# Patient Record
Sex: FEMALE | Race: WHITE | NOT HISPANIC OR LATINO | URBAN - METROPOLITAN AREA
[De-identification: names, ages, dates, MRNs, and addresses within clinical notes are randomized per-mention and may not be internally consistent; named-entity substitution may affect disease eponyms.]

---

## 2017-03-08 PROBLEM — Z00.00 ENCOUNTER FOR PREVENTIVE HEALTH EXAMINATION: Status: ACTIVE | Noted: 2017-03-08

## 2017-05-19 ENCOUNTER — OUTPATIENT (OUTPATIENT)
Dept: OUTPATIENT SERVICES | Facility: HOSPITAL | Age: 67
LOS: 1 days | Discharge: HOME | End: 2017-05-19

## 2017-06-28 DIAGNOSIS — E55.9 VITAMIN D DEFICIENCY, UNSPECIFIED: ICD-10-CM

## 2017-06-28 DIAGNOSIS — E03.9 HYPOTHYROIDISM, UNSPECIFIED: ICD-10-CM

## 2017-06-28 DIAGNOSIS — E10.65 TYPE 1 DIABETES MELLITUS WITH HYPERGLYCEMIA: ICD-10-CM

## 2017-06-29 ENCOUNTER — OUTPATIENT (OUTPATIENT)
Dept: OUTPATIENT SERVICES | Facility: HOSPITAL | Age: 67
LOS: 1 days | Discharge: HOME | End: 2017-06-29

## 2017-06-29 DIAGNOSIS — Z12.31 ENCOUNTER FOR SCREENING MAMMOGRAM FOR MALIGNANT NEOPLASM OF BREAST: ICD-10-CM

## 2017-07-10 ENCOUNTER — OUTPATIENT (OUTPATIENT)
Dept: OUTPATIENT SERVICES | Facility: HOSPITAL | Age: 67
LOS: 1 days | Discharge: HOME | End: 2017-07-10

## 2017-07-10 DIAGNOSIS — R92.8 OTHER ABNORMAL AND INCONCLUSIVE FINDINGS ON DIAGNOSTIC IMAGING OF BREAST: ICD-10-CM

## 2017-07-19 PROBLEM — D24.2 INTRADUCTAL PAPILLOMA OF BREAST, LEFT: Status: ACTIVE | Noted: 2017-07-19

## 2017-07-19 PROBLEM — N60.92 ATYPICAL LOBULAR HYPERPLASIA (ALH) OF LEFT BREAST: Status: ACTIVE | Noted: 2017-07-19

## 2017-07-19 PROBLEM — R92.8 ABNORMAL MAMMOGRAM OF RIGHT BREAST: Status: ACTIVE | Noted: 2017-06-29

## 2017-07-20 ENCOUNTER — APPOINTMENT (OUTPATIENT)
Dept: BREAST CENTER | Facility: CLINIC | Age: 67
End: 2017-07-20

## 2017-07-20 VITALS
WEIGHT: 190 LBS | BODY MASS INDEX: 35.87 KG/M2 | DIASTOLIC BLOOD PRESSURE: 88 MMHG | SYSTOLIC BLOOD PRESSURE: 154 MMHG | HEIGHT: 61 IN

## 2017-07-20 DIAGNOSIS — E11.9 TYPE 2 DIABETES MELLITUS W/OUT COMPLICATIONS: ICD-10-CM

## 2017-07-20 DIAGNOSIS — R92.8 OTHER ABNORMAL AND INCONCLUSIVE FINDINGS ON DIAGNOSTIC IMAGING OF BREAST: ICD-10-CM

## 2017-07-20 DIAGNOSIS — D24.2 BENIGN NEOPLASM OF LEFT BREAST: ICD-10-CM

## 2017-07-20 DIAGNOSIS — I10 ESSENTIAL (PRIMARY) HYPERTENSION: ICD-10-CM

## 2017-07-20 DIAGNOSIS — N60.92 UNSPECIFIED BENIGN MAMMARY DYSPLASIA OF LEFT BREAST: ICD-10-CM

## 2017-07-20 DIAGNOSIS — E78.5 HYPERLIPIDEMIA, UNSPECIFIED: ICD-10-CM

## 2017-07-20 RX ORDER — INSULIN ASPART 100 [IU]/ML
INJECTION, SOLUTION INTRAVENOUS; SUBCUTANEOUS
Refills: 0 | Status: ACTIVE | COMMUNITY

## 2017-07-20 RX ORDER — SIMVASTATIN 80 MG/1
TABLET, FILM COATED ORAL
Refills: 0 | Status: ACTIVE | COMMUNITY

## 2017-07-20 RX ORDER — VALSARTAN 160 MG
CAPSULE ORAL
Refills: 0 | Status: ACTIVE | COMMUNITY

## 2017-08-29 ENCOUNTER — OUTPATIENT (OUTPATIENT)
Dept: OUTPATIENT SERVICES | Facility: HOSPITAL | Age: 67
LOS: 1 days | Discharge: HOME | End: 2017-08-29

## 2017-08-29 DIAGNOSIS — E10.9 TYPE 1 DIABETES MELLITUS WITHOUT COMPLICATIONS: ICD-10-CM

## 2017-08-29 DIAGNOSIS — E78.5 HYPERLIPIDEMIA, UNSPECIFIED: ICD-10-CM

## 2017-11-29 ENCOUNTER — OUTPATIENT (OUTPATIENT)
Dept: OUTPATIENT SERVICES | Facility: HOSPITAL | Age: 67
LOS: 1 days | Discharge: HOME | End: 2017-11-29

## 2017-11-29 DIAGNOSIS — E10.65 TYPE 1 DIABETES MELLITUS WITH HYPERGLYCEMIA: ICD-10-CM

## 2018-02-23 ENCOUNTER — OUTPATIENT (OUTPATIENT)
Dept: OUTPATIENT SERVICES | Facility: HOSPITAL | Age: 68
LOS: 1 days | Discharge: HOME | End: 2018-02-23

## 2018-02-23 DIAGNOSIS — E10.65 TYPE 1 DIABETES MELLITUS WITH HYPERGLYCEMIA: ICD-10-CM

## 2018-02-23 DIAGNOSIS — E03.9 HYPOTHYROIDISM, UNSPECIFIED: ICD-10-CM

## 2018-02-23 DIAGNOSIS — E78.5 HYPERLIPIDEMIA, UNSPECIFIED: ICD-10-CM

## 2018-03-10 ENCOUNTER — EMERGENCY (EMERGENCY)
Facility: HOSPITAL | Age: 68
LOS: 0 days | Discharge: HOME | End: 2018-03-10
Admitting: INTERNAL MEDICINE

## 2018-03-10 VITALS
RESPIRATION RATE: 20 BRPM | SYSTOLIC BLOOD PRESSURE: 167 MMHG | HEART RATE: 99 BPM | OXYGEN SATURATION: 96 % | DIASTOLIC BLOOD PRESSURE: 72 MMHG | TEMPERATURE: 97 F

## 2018-03-10 DIAGNOSIS — R05 COUGH: ICD-10-CM

## 2018-03-10 DIAGNOSIS — I10 ESSENTIAL (PRIMARY) HYPERTENSION: ICD-10-CM

## 2018-03-10 DIAGNOSIS — J40 BRONCHITIS, NOT SPECIFIED AS ACUTE OR CHRONIC: ICD-10-CM

## 2018-03-10 DIAGNOSIS — Z79.4 LONG TERM (CURRENT) USE OF INSULIN: ICD-10-CM

## 2018-03-10 DIAGNOSIS — E11.9 TYPE 2 DIABETES MELLITUS WITHOUT COMPLICATIONS: ICD-10-CM

## 2018-03-10 DIAGNOSIS — Z79.899 OTHER LONG TERM (CURRENT) DRUG THERAPY: ICD-10-CM

## 2018-03-10 RX ORDER — SIMVASTATIN 20 MG/1
1 TABLET, FILM COATED ORAL
Qty: 0 | Refills: 0 | COMMUNITY

## 2018-03-10 RX ORDER — IPRATROPIUM/ALBUTEROL SULFATE 18-103MCG
3 AEROSOL WITH ADAPTER (GRAM) INHALATION
Qty: 0 | Refills: 0 | Status: COMPLETED | OUTPATIENT
Start: 2018-03-10 | End: 2018-03-10

## 2018-03-10 RX ADMIN — Medication 3 MILLILITER(S): at 11:46

## 2018-03-10 RX ADMIN — Medication 3 MILLILITER(S): at 12:07

## 2018-03-10 RX ADMIN — Medication 3 MILLILITER(S): at 12:44

## 2018-03-10 NOTE — ED PROVIDER NOTE - PROGRESS NOTE DETAILS
Risk of worsening pneumonia d/w pt.   Pt looks well.  Abx choice considered given pt's history. Speaking in full sentences.  Vitals noted, sepsis not supported.  Does not currently appear to need admission. Abx risk dw pt. Pt aware needs f/u with PCP. Pt aware needs ER if worse.  potential s/e of meds explained : cdfiff/qtp/tendonitis/tendon rupture.  advised to d/c use if any arrythmia/chest pain/palpitations and return to ER   if any other intolerable s/e dc use and rto for further assesment. Recommneded taking probiotics

## 2018-03-10 NOTE — ED PROVIDER NOTE - OBJECTIVE STATEMENT
67 year old female with pmhx of DM, presents with cough x 10 days. Pt admits went to PMD, was prescribed z-pack. Pt complaining of worsening cough. Pt denies CP, SOB, fever, chills, nasal congestion, sore throat, recent travel, or sick contacts.

## 2018-05-18 ENCOUNTER — OUTPATIENT (OUTPATIENT)
Dept: OUTPATIENT SERVICES | Facility: HOSPITAL | Age: 68
LOS: 1 days | Discharge: HOME | End: 2018-05-18

## 2018-05-18 DIAGNOSIS — E10.9 TYPE 1 DIABETES MELLITUS WITHOUT COMPLICATIONS: ICD-10-CM

## 2018-05-18 DIAGNOSIS — E03.9 HYPOTHYROIDISM, UNSPECIFIED: ICD-10-CM

## 2018-08-14 ENCOUNTER — INPATIENT (INPATIENT)
Facility: HOSPITAL | Age: 68
LOS: 1 days | Discharge: HOME | End: 2018-08-16
Attending: INTERNAL MEDICINE | Admitting: INTERNAL MEDICINE
Payer: MEDICARE

## 2018-08-14 VITALS
OXYGEN SATURATION: 95 % | TEMPERATURE: 98 F | SYSTOLIC BLOOD PRESSURE: 157 MMHG | RESPIRATION RATE: 20 BRPM | HEART RATE: 78 BPM | DIASTOLIC BLOOD PRESSURE: 65 MMHG

## 2018-08-14 PROBLEM — I10 ESSENTIAL (PRIMARY) HYPERTENSION: Chronic | Status: ACTIVE | Noted: 2018-03-10

## 2018-08-14 PROBLEM — E11.9 TYPE 2 DIABETES MELLITUS WITHOUT COMPLICATIONS: Chronic | Status: ACTIVE | Noted: 2018-03-10

## 2018-08-14 LAB
ALBUMIN SERPL ELPH-MCNC: 4 G/DL — SIGNIFICANT CHANGE UP (ref 3.5–5.2)
ALP SERPL-CCNC: 60 U/L — SIGNIFICANT CHANGE UP (ref 30–115)
ALT FLD-CCNC: 34 U/L — SIGNIFICANT CHANGE UP (ref 0–41)
ANION GAP SERPL CALC-SCNC: 13 MMOL/L — SIGNIFICANT CHANGE UP (ref 7–14)
APTT BLD: 41.1 SEC — HIGH (ref 27–39.2)
AST SERPL-CCNC: 34 U/L — SIGNIFICANT CHANGE UP (ref 0–41)
BASOPHILS # BLD AUTO: 0.03 K/UL — SIGNIFICANT CHANGE UP (ref 0–0.2)
BASOPHILS NFR BLD AUTO: 0.4 % — SIGNIFICANT CHANGE UP (ref 0–1)
BILIRUB SERPL-MCNC: 0.3 MG/DL — SIGNIFICANT CHANGE UP (ref 0.2–1.2)
BLD GP AB SCN SERPL QL: SIGNIFICANT CHANGE UP
BUN SERPL-MCNC: 18 MG/DL — SIGNIFICANT CHANGE UP (ref 10–20)
CALCIUM SERPL-MCNC: 9.7 MG/DL — SIGNIFICANT CHANGE UP (ref 8.5–10.1)
CHLORIDE SERPL-SCNC: 101 MMOL/L — SIGNIFICANT CHANGE UP (ref 98–110)
CO2 SERPL-SCNC: 26 MMOL/L — SIGNIFICANT CHANGE UP (ref 17–32)
CREAT SERPL-MCNC: 0.7 MG/DL — SIGNIFICANT CHANGE UP (ref 0.7–1.5)
EOSINOPHIL # BLD AUTO: 0.14 K/UL — SIGNIFICANT CHANGE UP (ref 0–0.7)
EOSINOPHIL NFR BLD AUTO: 2 % — SIGNIFICANT CHANGE UP (ref 0–8)
GLUCOSE SERPL-MCNC: 105 MG/DL — HIGH (ref 70–99)
HCT VFR BLD CALC: 36.6 % — LOW (ref 37–47)
HGB BLD-MCNC: 12 G/DL — SIGNIFICANT CHANGE UP (ref 12–16)
IMM GRANULOCYTES NFR BLD AUTO: 0.3 % — SIGNIFICANT CHANGE UP (ref 0.1–0.3)
INR BLD: 1.18 RATIO — SIGNIFICANT CHANGE UP (ref 0.65–1.3)
LYMPHOCYTES # BLD AUTO: 2.61 K/UL — SIGNIFICANT CHANGE UP (ref 1.2–3.4)
LYMPHOCYTES # BLD AUTO: 37.7 % — SIGNIFICANT CHANGE UP (ref 20.5–51.1)
MAGNESIUM SERPL-MCNC: 1.9 MG/DL — SIGNIFICANT CHANGE UP (ref 1.8–2.4)
MCHC RBC-ENTMCNC: 30.5 PG — SIGNIFICANT CHANGE UP (ref 27–31)
MCHC RBC-ENTMCNC: 32.8 G/DL — SIGNIFICANT CHANGE UP (ref 32–37)
MCV RBC AUTO: 92.9 FL — SIGNIFICANT CHANGE UP (ref 81–99)
MONOCYTES # BLD AUTO: 0.55 K/UL — SIGNIFICANT CHANGE UP (ref 0.1–0.6)
MONOCYTES NFR BLD AUTO: 7.9 % — SIGNIFICANT CHANGE UP (ref 1.7–9.3)
NEUTROPHILS # BLD AUTO: 3.58 K/UL — SIGNIFICANT CHANGE UP (ref 1.4–6.5)
NEUTROPHILS NFR BLD AUTO: 51.7 % — SIGNIFICANT CHANGE UP (ref 42.2–75.2)
NRBC # BLD: 0 /100 WBCS — SIGNIFICANT CHANGE UP (ref 0–0)
PLATELET # BLD AUTO: 163 K/UL — SIGNIFICANT CHANGE UP (ref 130–400)
POTASSIUM SERPL-MCNC: 4.4 MMOL/L — SIGNIFICANT CHANGE UP (ref 3.5–5)
POTASSIUM SERPL-SCNC: 4.4 MMOL/L — SIGNIFICANT CHANGE UP (ref 3.5–5)
PROT SERPL-MCNC: 7.3 G/DL — SIGNIFICANT CHANGE UP (ref 6–8)
PROTHROM AB SERPL-ACNC: 12.7 SEC — SIGNIFICANT CHANGE UP (ref 9.95–12.87)
RBC # BLD: 3.94 M/UL — LOW (ref 4.2–5.4)
RBC # FLD: 13.2 % — SIGNIFICANT CHANGE UP (ref 11.5–14.5)
SODIUM SERPL-SCNC: 140 MMOL/L — SIGNIFICANT CHANGE UP (ref 135–146)
TYPE + AB SCN PNL BLD: SIGNIFICANT CHANGE UP
WBC # BLD: 6.93 K/UL — SIGNIFICANT CHANGE UP (ref 4.8–10.8)
WBC # FLD AUTO: 6.93 K/UL — SIGNIFICANT CHANGE UP (ref 4.8–10.8)

## 2018-08-14 RX ORDER — ASPIRIN/CALCIUM CARB/MAGNESIUM 324 MG
81 TABLET ORAL DAILY
Qty: 0 | Refills: 0 | Status: DISCONTINUED | OUTPATIENT
Start: 2018-08-14 | End: 2018-08-16

## 2018-08-14 RX ORDER — VALACYCLOVIR 500 MG/1
1000 TABLET, FILM COATED ORAL THREE TIMES A DAY
Qty: 0 | Refills: 0 | Status: DISCONTINUED | OUTPATIENT
Start: 2018-08-15 | End: 2018-08-16

## 2018-08-14 RX ORDER — SODIUM CHLORIDE 9 MG/ML
1000 INJECTION, SOLUTION INTRAVENOUS ONCE
Qty: 0 | Refills: 0 | Status: COMPLETED | OUTPATIENT
Start: 2018-08-14 | End: 2018-08-14

## 2018-08-14 RX ORDER — METOCLOPRAMIDE HCL 10 MG
10 TABLET ORAL ONCE
Qty: 0 | Refills: 0 | Status: COMPLETED | OUTPATIENT
Start: 2018-08-14 | End: 2018-08-14

## 2018-08-14 RX ORDER — SIMVASTATIN 20 MG/1
40 TABLET, FILM COATED ORAL AT BEDTIME
Qty: 0 | Refills: 0 | Status: DISCONTINUED | OUTPATIENT
Start: 2018-08-14 | End: 2018-08-16

## 2018-08-14 RX ORDER — ACYCLOVIR SODIUM 500 MG
400 VIAL (EA) INTRAVENOUS ONCE
Qty: 0 | Refills: 0 | Status: COMPLETED | OUTPATIENT
Start: 2018-08-14 | End: 2018-08-14

## 2018-08-14 RX ORDER — ACETAMINOPHEN 500 MG
650 TABLET ORAL ONCE
Qty: 0 | Refills: 0 | Status: COMPLETED | OUTPATIENT
Start: 2018-08-14 | End: 2018-08-14

## 2018-08-14 RX ORDER — LOSARTAN POTASSIUM 100 MG/1
50 TABLET, FILM COATED ORAL DAILY
Qty: 0 | Refills: 0 | Status: DISCONTINUED | OUTPATIENT
Start: 2018-08-14 | End: 2018-08-16

## 2018-08-14 RX ADMIN — Medication 81 MILLIGRAM(S): at 23:00

## 2018-08-14 RX ADMIN — SODIUM CHLORIDE 1000 MILLILITER(S): 9 INJECTION, SOLUTION INTRAVENOUS at 16:36

## 2018-08-14 RX ADMIN — Medication 60 MILLIGRAM(S): at 23:00

## 2018-08-14 RX ADMIN — SIMVASTATIN 40 MILLIGRAM(S): 20 TABLET, FILM COATED ORAL at 23:00

## 2018-08-14 RX ADMIN — Medication 10 MILLIGRAM(S): at 16:36

## 2018-08-14 RX ADMIN — Medication 650 MILLIGRAM(S): at 16:36

## 2018-08-14 RX ADMIN — Medication 400 MILLIGRAM(S): at 18:30

## 2018-08-14 NOTE — ED PROVIDER NOTE - PROGRESS NOTE DETAILS
Attending Note: 68 y/o F PMHx HTN, diabetes, and high cholesterol presents for evaluation of headache and facial numbness. Pt c/o diffuse pressure-like headache for the last few days and today pt noticed droop to R side of face. PE: Heart RRR. Lungs CTAB. Abdomen soft NTND. PERRL, EOMI. R sided facial droop; when asked to close eyes pt unable to keep R eye closed, decreased wrinkling R forehead. Normal finger to nose, motor 5/5 x4, sensation to extremities WNL.  Plan: Will get head CT and discuss with neuro, possible Carreno’s vs. CVA.

## 2018-08-14 NOTE — ED ADULT NURSE NOTE - OBJECTIVE STATEMENT
patient presents to ER with right sided facial droop since 0900, also complaining of headache. alert and in no distress.

## 2018-08-14 NOTE — ED ADULT NURSE REASSESSMENT NOTE - NS ED NURSE REASSESS COMMENT FT1
patient assessed and alert and oriented x 3, no complaints. right facial droop since this morning at 0900, clear speech, updated with plan of care. will continue to monitor and assess.
pt headache improved after medications, alert and oriented x 3, in no distress. will continue to monitor and assess.

## 2018-08-14 NOTE — ED PROVIDER NOTE - PHYSICAL EXAMINATION
VITAL SIGNS: I have reviewed nursing notes and confirm.  CONSTITUTIONAL: Well-developed; well-nourished; in no acute distress. pt comfortable. r. sided facial droop to lip.  very mild decrease creases on r. side of forehead.  SKIN: skin exam is warm and dry, no acute rash.   HEAD: Normocephalic; atraumatic.  EYES:  EOM intact; conjunctiva and sclera clear.  ENT: No nasal discharge; airway clear. moist oral mucosa; uvula at midline. no pharyngeal erythema, edema exudate or vesicles.  TMs with good light reflex b/l.    NECK: Supple; non tender.  CARD: S1, S2 normal; no murmurs, gallops, or rubs. Regular rate and rhythm. posterior tibial and radial pulses 2+  RESP: No wheezes, rales or rhonchi. cta b/l. no use of accessory muscles. no retractions  ABD: Normal bowel sounds; soft; non-distended; non-tender;   EXT: Normal ROM. No  cyanosis or edema.  BACK: No cva tenderness  LYMPH: No acute cervical adenopathy.  NEURO: Alert, oriented, grossly unremarkable.  CN 2-12 intact- except 7- droop to r. side and mild decrease creases to r. side of forehead.  mild decrease strength to closing eyes at r.. normal gait. normal romberg's.  sensory grossly intact to face, upper and lower extremity.  5/5 strength to , , flexion at hip, extension and flexion at knees. normal finger to nose  PSYCH: Cooperative, appropriate.

## 2018-08-14 NOTE — ED PROVIDER NOTE - OBJECTIVE STATEMENT
68y/o F w/ hx of htn and cholesterol presents for waking up today at 9am and noticing droop to r. side of face-- 68y/o F w/ hx of htn and cholesterol presents for waking up today at 9am and noticing droop to r. side of face--pt has not had this in the past.  has had a headache   As per patient headache started 3 days ago, it was diffuse pressure like 8/10 in intensity non radiating not associated with nausea, blurring of vision, or dizziness, relived with advil, since today headache has worsened, 9/10 in intensity, no associated symptoms ore on right side, with radiation to the back followed by right sided facial numbness, she is unable to close her eyes.  she denies any preceding aura, light flashes, no rinorrhoea  she denies weakness in any other body part, no fever, denies any chest pain or palpitaion  but has occasional SOB  with exertion

## 2018-08-14 NOTE — CONSULT NOTE ADULT - ASSESSMENT
67 years old female pt with past medical hx of type 2 DM, HTN, dyslipidemia presented with 3 days headache and 1 day h/o of right facial,weakness   - ct scan head negative  - need to rule out stroke /? bells palsy/ diabetic associated facial palsy  - recommend MRI head , echocardiogram and carotid doppler  - recommend for admission  - start aspirin and high dose statin   - will follow

## 2018-08-14 NOTE — H&P ADULT - NSHPLABSRESULTS_GEN_ALL_CORE
12.0   6.93  )-----------( 163      ( 14 Aug 2018 16:40 )             36.6       08-14    140  |  101  |  18  ----------------------------<  105<H>  4.4   |  26  |  0.7    Ca    9.7      14 Aug 2018 16:40  Mg     1.9     08-14    TPro  7.3  /  Alb  4.0  /  TBili  0.3  /  DBili  x   /  AST  34  /  ALT  34  /  AlkPhos  60  08-14       PT/INR - ( 14 Aug 2018 16:40 )   PT: 12.70 sec;   INR: 1.18 ratio         PTT - ( 14 Aug 2018 16:40 )  PTT:41.1 sec 12.0   6.93  )-----------( 163      ( 14 Aug 2018 16:40 )             36.6       08-14    140  |  101  |  18  ----------------------------<  105<H>  4.4   |  26  |  0.7    Ca    9.7      14 Aug 2018 16:40  Mg     1.9     08-14    TPro  7.3  /  Alb  4.0  /  TBili  0.3  /  DBili  x   /  AST  34  /  ALT  34  /  AlkPhos  60  08-14       PT/INR - ( 14 Aug 2018 16:40 )   PT: 12.70 sec;   INR: 1.18 ratio         PTT - ( 14 Aug 2018 16:40 )  PTT:41.1 sec    < from: VA Duplex Carotid, Bilat (08.15.18 @ 08:04) >    20-39% stenosis of the right internal carotid artery.  20-39% stenosis of the left internal carotid artery.    < end of copied text >    < from: CT Head No Cont (08.14.18 @ 15:21) >    No CT evidence of acute intracranial pathology.      < end of copied text >    < from: 12 Lead ECG (08.14.18 @ 19:31) >    Normal sinus rhythm  Normal ECG  Interpreted by myself: QTc wnl, no acute STTW changes.

## 2018-08-14 NOTE — ED ADULT NURSE NOTE - ED STAT RN HANDOFF DETAILS
pt resting in bed at this time no complaints of pain IC intact safety and comfort maintained, will contuinue to monitor report given to saad sanz rn.

## 2018-08-14 NOTE — H&P ADULT - ATTENDING COMMENTS
Patient is seen and examined independently. I agree with resident note above and plan of care -edited and corrected where applicable (see corrections).

## 2018-08-14 NOTE — ED PROVIDER NOTE - NS ED ROS FT
ROS: No fever/chills, No headache/photophobia/eye pain/changes in vision, No ear pain/sore throat/dysphagia, No chest pain/palpitations, no SOB/cough/wheeze/stridor, No abdominal pain, No N/V/D/melena, no dysuria/frequency/discharge, No neck/back pain, no rash,       +facial droop

## 2018-08-14 NOTE — H&P ADULT - NSHPPHYSICALEXAM_GEN_ALL_CORE
PHYSICAL EXAM:  GENERAL: NAD, right facial droop, coherent, fluid speech  HEAD:  Atraumatic, Normocephalic  EYES: EOMI, PERRLA, conjunctiva and sclera clear, patient able to close both eyes (despite previous documentation that she could not prior)   ENMT: No tonsillar erythema, exudates, or enlargement; Moist mucous membranes, Good dentition, No lesions  NECK: Supple, No JVD, Normal thyroid  NERVOUS SYSTEM:  Alert & Oriented X3, Good concentration; Motor Strength 5/5 B/L upper and lower extremities; DTRs 2+ intact and symmetric  CHEST/LUNG: Clear to percussion bilaterally; No rales, rhonchi, wheezing, or rubs  HEART: Regular rate and rhythm; No murmurs, rubs, or gallops  ABDOMEN: Soft, Nontender, Nondistended; Bowel sounds present  EXTREMITIES:  2+ Peripheral Pulses, No clubbing, cyanosis, or edema  LYMPH: No lymphadenopathy noted  SKIN: No rashes or lesions

## 2018-08-14 NOTE — H&P ADULT - ASSESSMENT
66 yo F presents with complaint of sudden onset right sided facial droop occurring 8AM on morning of presentation as well as right sided headaches for 4 days duration.    r/o CVA vs. Bell's Palsy vs. Diabetic Associated Facial Palsy  -CTH negative intracranial pathology  -MRI Brain ordered as requested by neuro, unable to reach MRI tech, patient has medtronic insulin pump, please verify compatibility prior to proceeding  -F/U 2D-Echo, Carotid duplex  -Will initiate ASA-81mg, will increase patient's simvastatin to 40mg Q24H from 20mg  -Will treat for presumed acute Bell's Palsy: Prednisone 60mg Q24H, and Valacyclovir 1000mg TID  -Check Hemoglobin A1C%, lipid panel  -Neurology following    HTN  -Will start Losartan 50mg in lieu of Valsartan 40mg BID (non formulary)     DM   -Check fingerstick, patient doses her own insulin pump    CODE STATUS: FULL CODE    Disposition: Home when stable 68 yo F presents with complaint of sudden onset right sided facial droop occurring 8AM on morning of presentation as well as right sided headaches for 4 days duration.    r/o CVA vs. Bell's Palsy vs. Diabetic Associated Facial Palsy  -CTH negative intracranial pathology  -MRI Brain ordered as requested by neuro, unable to reach MRI tech, patient has medtronic insulin pump, please verify compatibility prior to proceeding  -F/U 2D-Echo, Carotid duplex  -Will initiate ASA-81mg, will increase patient's simvastatin to 40mg Q24H from 20mg  -Will treat for presumed acute Bell's Palsy: Prednisone 60mg Q24H, and Valacyclovir 1000mg TID as recommended by neuro as per ED staff  -Check Hemoglobin A1C%, lipid panel  -Neurology following    HTN  -Will start Losartan 50mg in lieu of Valsartan 40mg BID (non formulary)     DM   -Check fingerstick, patient doses her own insulin pump    CODE STATUS: FULL CODE    Disposition: Home when stable 66 yo F presents with complaint of sudden onset right sided facial droop occurring 8AM on morning of presentation as well as right sided headaches for 4 days duration.    r/o CVA vs. Bell's Palsy vs. Diabetic Associated Facial Palsy  -CTH negative intracranial pathology  -MRI Brain ordered as requested by neuro, unable to reach MRI tech, patient has medtronic insulin pump, please verify compatibility prior to proceeding  -F/U 2D-Echo, Carotid duplex  -Will initiate ASA-81mg, will increase patient's simvastatin to 40mg Q24H from 20mg  -Will treat for presumed acute Bell's Palsy: Prednisone 60mg Q24H, and Valacyclovir 1000mg TID as recommended by neuro as per ED staff  -Check Hemoglobin A1C%, lipid panel  -Neurology following    HTN, controlled  -Will start Losartan 50mg in lieu of Valsartan 40mg BID (non formulary)     DM   -Check fingerstick, patient doses her own insulin pump    CODE STATUS: FULL CODE    Disposition: Home when stable

## 2018-08-14 NOTE — H&P ADULT - NSHPSOCIALHISTORY_GEN_ALL_CORE
Patient lives alone, is independent with all activities of daily living. Patient denies tobacco, ETOH and illicit drug abuse.

## 2018-08-14 NOTE — H&P ADULT - NSHPREVIEWOFSYSTEMS_GEN_ALL_CORE
REVIEW OF SYSTEMS:  CONSTITUTIONAL: No fever, weight loss, or fatigue  EYES: No eye pain, visual disturbances, or discharge  ENMT:  No difficulty hearing, tinnitus, vertigo; No sinus or throat pain  NECK: No pain or stiffness  BREASTS: No pain, masses, or nipple discharge  RESPIRATORY: No cough, wheezing, chills or hemoptysis; No shortness of breath  CARDIOVASCULAR: No chest pain, palpitations, dizziness, or leg swelling  GASTROINTESTINAL: No abdominal or epigastric pain. No nausea, vomiting, or hematemesis; No diarrhea or constipation. No melena or hematochezia.  GENITOURINARY: No dysuria, frequency, hematuria, or incontinence  NEUROLOGICAL: positive headaches, negative memory loss, loss of strength, numbness, or tremors  SKIN: No itching, burning, rashes, or lesions   LYMPH NODES: No enlarged glands  ENDOCRINE: No heat or cold intolerance; No hair loss  MUSCULOSKELETAL: No joint pain or swelling; No muscle, back, or extremity pain  PSYCHIATRIC: No depression, anxiety, mood swings, or difficulty sleeping  HEME/LYMPH: No easy bruising, or bleeding gums  ALLERGY AND IMMUNOLOGIC: No hives or eczema

## 2018-08-14 NOTE — CONSULT NOTE ADULT - SUBJECTIVE AND OBJECTIVE BOX
Neurology Consult  67 years old female pt with past medical hx of HTN on valsartan 40 mg, type 2 DM on insulin pump, dyslipidemia on simvastatin 20 mg came too ER because of 3 days headache followed by 1 day h/o of right facial numbness.  As per patient headache started 3 days ago, it was diffuse pressure like 8/10 in intensity non radiating not associated with nausea, blurring of vision, or dizziness, relived with advil, since today headache has worsened, 9/10 in intensity, no associated symptoms ore on right side, with radiation to the back followed by right sided facial numbness, she is unable to close her eyes.  she denies any preceding aura, light flashes, no rinorrhoea  she denies weakness in any other body part, no fever, denies any chest pain or palpitaion  but has occasional SOB  with exertion      PAST MEDICAL & SURGICAL HISTORY:  Hypertension  Diabetes  dyslipidemia    Allergies    No Known Allergies    Intolerances    MEDICATIONS  (STANDING):  lactated ringers Bolus 1000 milliLiter(s) IV Bolus once  metoclopramide Injectable 10 milliGRAM(s) IV Push once    MEDICATIONS  (PRN):  acetaminophen   Tablet 650 milliGRAM(s) Oral once PRN pain    as per [facility]    Vital Signs Last 24 Hrs  T(C): 36.5 (14 Aug 2018 13:23), Max: 36.5 (14 Aug 2018 13:23)  T(F): 97.7 (14 Aug 2018 13:23), Max: 97.7 (14 Aug 2018 13:23)  HR: 78 (14 Aug 2018 13:23) (78 - 78)  BP: 157/65 (14 Aug 2018 13:23) (157/65 - 157/65)  RR: 20 (14 Aug 2018 13:23) (20 - 20)  SpO2: 95% (14 Aug 2018 13:23) (95% - 95%)    Neurologic Examination:  General:  Appearance is consistent with chronologic age.  No abnormal facies.   General: The patient is oriented to person, place, time and date.  Recent and remote memory intact.  Fund of knowledge is intact and normal.  Language with normal repetition, comprehension and naming.  Nondysarthric.    Cranial nerves: intact VA, VFF.  EOMI w/o nystagmus, skew or reported double vision.  PERRL.  No ptosis/weakness of eyelid closure.  Facial sensation is normal with normal bite.  No facial asymmetry.  Hearing grossly intact b/l.  Palate elevates midline.  Tongue midline.  Motor examination:   Normal tone, bulk and range of motion.  No tenderness, twitching, tremors or involuntary movements.  Formal Muscle Strength Testing: (MRC grade R/L) 5/5 UE; 5/5 LE.  No observable drift.  Reflexes:   2+ b/l pectoralis, biceps, triceps, brachioradialis, patella and Achilles.  Plantar response downgoing b/l.  Jaw jerk, Tom, clonus absent.  Sensory examination:   Intact to light touch and pinprick, pain, temperature and proprioception and vibration in all extremities.  Cerebellum:   FTN/HKS intact with normal PLACIDO in all limbs.  No dysmetria or dysdiadokinesia.  Gait narrow based and normal.    Labs:       Neuroimaging:  NCHCT: CT Head No Cont:   EXAM:  CT BRAIN            PROCEDURE DATE:  08/14/2018      INTERPRETATION:  Clinical History / Reason for exam: Right-sided drooping    Technique: Noncontrast head CT.  Contiguous unenhanced CT axial images of   the head from the base to the vertex with coronal and sagittal reformats.    Correlation made with brain MRI dated 2/5/2017      Findings:    The ventricles and cortical sulci are normal in size and configuration.     There is no acute intracranial hemorrhage, extra-axial fluid collection   or midline shift.  Gray-white matter differentiation is maintained.     The visualized paranasal sinuses and mastoids are well-aerated.    IMPRESSION:     No CT evidence of acute intracranial pathology. Neurology Consult  67 years old female pt with past medical hx of HTN on valsartan 40 mg, type 2 DM on insulin pump, dyslipidemia on simvastatin 20 mg came too ER because of 3 days headache followed by 1 day h/o of right facial numbness.  As per patient headache started 3 days ago, it was diffuse pressure like 8/10 in intensity non radiating not associated with nausea, blurring of vision, or dizziness, relived with advil, since today headache has worsened, 9/10 in intensity, no associated symptoms ore on right side, with radiation to the back followed by right sided facial numbness, she is unable to close her eyes.  she denies any preceding aura, light flashes, no rinorrhoea  she denies weakness in any other body part, no fever, denies any chest pain or palpitaion  but has occasional SOB  with exertion      PAST MEDICAL & SURGICAL HISTORY:  Hypertension  Diabetes  dyslipidemia    Allergies    No Known Allergies    Intolerances    MEDICATIONS  (STANDING):  lactated ringers Bolus 1000 milliLiter(s) IV Bolus once  metoclopramide Injectable 10 milliGRAM(s) IV Push once    MEDICATIONS  (PRN):  acetaminophen   Tablet 650 milliGRAM(s) Oral once PRN pain    as per [facility]    Vital Signs Last 24 Hrs  T(C): 36.5 (14 Aug 2018 13:23), Max: 36.5 (14 Aug 2018 13:23)  T(F): 97.7 (14 Aug 2018 13:23), Max: 97.7 (14 Aug 2018 13:23)  HR: 78 (14 Aug 2018 13:23) (78 - 78)  BP: 157/65 (14 Aug 2018 13:23) (157/65 - 157/65)  RR: 20 (14 Aug 2018 13:23) (20 - 20)  SpO2: 95% (14 Aug 2018 13:23) (95% - 95%)    Neurologic Examination:  General:  Appearance is consistent with chronologic age.  No abnormal facies.   General: The patient is oriented to person, place, time and date.  Recent and remote memory intact.  Fund of knowledge is intact and normal.  Language with normal repetition, comprehension and naming.  Nondysarthric.    Cranial nerves: rt facial weakness , able to frown, mouth deviation towards left, other cranial nerves intact  Motor examination:   Normal tone, bulk and range of motion.  No tenderness, twitching, tremors or involuntary movements.  Formal Muscle Strength Testing: (MRC grade R/L) 5/5 UE; 5/5 LE.  No observable drift.  Reflexes:   2+ b/l pectoralis, biceps, triceps, brachioradialis, patella and Achilles.  Plantar response downgoing b/l.  Jaw jerk, Tom, clonus absent.  Sensory examination:   Intact to light touch and pinprick, pain, temperature and proprioception and vibration in all extremities.  Cerebellum:   FTN/HKS intact with normal PLACIDO in all limbs.  No dysmetria or dysdiadokinesia.  Gait narrow based and normal.    Labs:       Neuroimaging:  NCHCT: CT Head No Cont:   EXAM:  CT BRAIN            PROCEDURE DATE:  08/14/2018      INTERPRETATION:  Clinical History / Reason for exam: Right-sided drooping    Technique: Noncontrast head CT.  Contiguous unenhanced CT axial images of   the head from the base to the vertex with coronal and sagittal reformats.    Correlation made with brain MRI dated 2/5/2017      Findings:    The ventricles and cortical sulci are normal in size and configuration.     There is no acute intracranial hemorrhage, extra-axial fluid collection   or midline shift.  Gray-white matter differentiation is maintained.     The visualized paranasal sinuses and mastoids are well-aerated.    IMPRESSION:     No CT evidence of acute intracranial pathology.

## 2018-08-14 NOTE — H&P ADULT - HISTORY OF PRESENT ILLNESS
66 yo F with PMHx of HTN, HLD, DM II (has insulin pump) presents with complaint of sudden onset right sided facial droop occurring at 8AM on morning of presentation, as well as 4 day history of right sided headache. Patient states she awoke to wash her face when upon looking in the mirror noticed right facial drooping. Patient also endorses F w/ hx of htn and cholesterol presents for waking up today at 9am and noticing droop to r. side of face--pt has not had this in the past.  has had a headache As per patient headache started 3 days ago, it was diffuse pressure like 8/10 in intensity non radiating not associated with nausea, blurring of vision, or dizziness, relived with advil, since today headache has worsened, 9/10 in intensity, no associated symptoms ore on right side, with radiation to the back followed by right sided facial numbness, she is unable to close her eyes.  	she denies any preceding aura, light flashes, no rinorrhoea  	she denies weakness in any other body part, no fever, denies any chest pain or palpitaion but has occasional SOB  with exertion 66 yo F with PMHx of HTN, HLD, DM II (has insulin pump) presents with complaint of sudden onset right sided facial droop occurring at 8AM on morning of presentation, as well as 4 day history of right sided headache. Patient states she awoke to wash her face when upon looking in the mirror noticed right facial drooping. Patient also endorses right sided headaches, pressure-like in nature not associated with photo/phonophobia. Pain mildly alleviated with Advil. Patient denies any weakness, paraesthesias chest pain, palpitations changes in vision, fever, chills, nausea, vomiting.     In ED, received Acyclovir 400mg PO x1, 1L LR bolus.

## 2018-08-14 NOTE — CONSULT NOTE ADULT - ATTENDING COMMENTS
Pt interviewed and examined on 8/14.  She seems to have a partial peripheral 7th nerve palsy.    This may be viral or diabetic 7th.  Less likely stroke.  Recommend steroid taper and neuroimaging with MRI brain.

## 2018-08-15 LAB
ANION GAP SERPL CALC-SCNC: 12 MMOL/L — SIGNIFICANT CHANGE UP (ref 7–14)
BUN SERPL-MCNC: 14 MG/DL — SIGNIFICANT CHANGE UP (ref 10–20)
CALCIUM SERPL-MCNC: 9.3 MG/DL — SIGNIFICANT CHANGE UP (ref 8.5–10.1)
CHLORIDE SERPL-SCNC: 102 MMOL/L — SIGNIFICANT CHANGE UP (ref 98–110)
CHOLEST SERPL-MCNC: 160 MG/DL — SIGNIFICANT CHANGE UP (ref 100–200)
CO2 SERPL-SCNC: 25 MMOL/L — SIGNIFICANT CHANGE UP (ref 17–32)
CREAT SERPL-MCNC: 0.6 MG/DL — LOW (ref 0.7–1.5)
GLUCOSE SERPL-MCNC: 232 MG/DL — HIGH (ref 70–99)
HCT VFR BLD CALC: 37.6 % — SIGNIFICANT CHANGE UP (ref 37–47)
HDLC SERPL-MCNC: 48 MG/DL — SIGNIFICANT CHANGE UP (ref 40–125)
HGB BLD-MCNC: 12.3 G/DL — SIGNIFICANT CHANGE UP (ref 12–16)
LIPID PNL WITH DIRECT LDL SERPL: 107 MG/DL — SIGNIFICANT CHANGE UP (ref 4–129)
MAGNESIUM SERPL-MCNC: 1.7 MG/DL — LOW (ref 1.8–2.4)
MCHC RBC-ENTMCNC: 30 PG — SIGNIFICANT CHANGE UP (ref 27–31)
MCHC RBC-ENTMCNC: 32.7 G/DL — SIGNIFICANT CHANGE UP (ref 32–37)
MCV RBC AUTO: 91.7 FL — SIGNIFICANT CHANGE UP (ref 81–99)
NRBC # BLD: 0 /100 WBCS — SIGNIFICANT CHANGE UP (ref 0–0)
PLATELET # BLD AUTO: 160 K/UL — SIGNIFICANT CHANGE UP (ref 130–400)
POTASSIUM SERPL-MCNC: 4.3 MMOL/L — SIGNIFICANT CHANGE UP (ref 3.5–5)
POTASSIUM SERPL-SCNC: 4.3 MMOL/L — SIGNIFICANT CHANGE UP (ref 3.5–5)
RBC # BLD: 4.1 M/UL — LOW (ref 4.2–5.4)
RBC # FLD: 13 % — SIGNIFICANT CHANGE UP (ref 11.5–14.5)
SODIUM SERPL-SCNC: 139 MMOL/L — SIGNIFICANT CHANGE UP (ref 135–146)
TOTAL CHOLESTEROL/HDL RATIO MEASUREMENT: 3.3 RATIO — LOW (ref 4–5.5)
TRIGL SERPL-MCNC: 62 MG/DL — SIGNIFICANT CHANGE UP (ref 10–149)
WBC # BLD: 5.54 K/UL — SIGNIFICANT CHANGE UP (ref 4.8–10.8)
WBC # FLD AUTO: 5.54 K/UL — SIGNIFICANT CHANGE UP (ref 4.8–10.8)

## 2018-08-15 PROCEDURE — 93880 EXTRACRANIAL BILAT STUDY: CPT | Mod: 26

## 2018-08-15 RX ADMIN — VALACYCLOVIR 1000 MILLIGRAM(S): 500 TABLET, FILM COATED ORAL at 06:29

## 2018-08-15 RX ADMIN — VALACYCLOVIR 1000 MILLIGRAM(S): 500 TABLET, FILM COATED ORAL at 23:03

## 2018-08-15 RX ADMIN — LOSARTAN POTASSIUM 50 MILLIGRAM(S): 100 TABLET, FILM COATED ORAL at 06:30

## 2018-08-15 RX ADMIN — VALACYCLOVIR 1000 MILLIGRAM(S): 500 TABLET, FILM COATED ORAL at 14:40

## 2018-08-15 RX ADMIN — Medication 81 MILLIGRAM(S): at 11:13

## 2018-08-15 RX ADMIN — SIMVASTATIN 40 MILLIGRAM(S): 20 TABLET, FILM COATED ORAL at 23:02

## 2018-08-16 ENCOUNTER — TRANSCRIPTION ENCOUNTER (OUTPATIENT)
Age: 68
End: 2018-08-16

## 2018-08-16 VITALS — HEIGHT: 61 IN

## 2018-08-16 RX ORDER — LOSARTAN POTASSIUM 100 MG/1
100 TABLET, FILM COATED ORAL DAILY
Qty: 0 | Refills: 0 | Status: DISCONTINUED | OUTPATIENT
Start: 2018-08-16 | End: 2018-08-16

## 2018-08-16 RX ORDER — VALACYCLOVIR 500 MG/1
1 TABLET, FILM COATED ORAL
Qty: 18 | Refills: 0 | OUTPATIENT
Start: 2018-08-16 | End: 2018-08-21

## 2018-08-16 RX ORDER — ASPIRIN/CALCIUM CARB/MAGNESIUM 324 MG
1 TABLET ORAL
Qty: 0 | Refills: 0 | COMMUNITY
Start: 2018-08-16

## 2018-08-16 RX ORDER — LOSARTAN POTASSIUM 100 MG/1
50 TABLET, FILM COATED ORAL ONCE
Qty: 0 | Refills: 0 | Status: DISCONTINUED | OUTPATIENT
Start: 2018-08-16 | End: 2018-08-16

## 2018-08-16 RX ORDER — VALSARTAN 80 MG/1
1 TABLET ORAL
Qty: 0 | Refills: 0 | COMMUNITY

## 2018-08-16 RX ORDER — VALACYCLOVIR 500 MG/1
1 TABLET, FILM COATED ORAL
Qty: 21 | Refills: 0 | OUTPATIENT
Start: 2018-08-16 | End: 2018-08-22

## 2018-08-16 RX ORDER — LOSARTAN POTASSIUM 100 MG/1
1 TABLET, FILM COATED ORAL
Qty: 30 | Refills: 0 | OUTPATIENT
Start: 2018-08-16 | End: 2018-09-14

## 2018-08-16 RX ADMIN — LOSARTAN POTASSIUM 50 MILLIGRAM(S): 100 TABLET, FILM COATED ORAL at 06:16

## 2018-08-16 RX ADMIN — VALACYCLOVIR 1000 MILLIGRAM(S): 500 TABLET, FILM COATED ORAL at 13:49

## 2018-08-16 RX ADMIN — Medication 60 MILLIGRAM(S): at 06:16

## 2018-08-16 RX ADMIN — Medication 81 MILLIGRAM(S): at 13:49

## 2018-08-16 RX ADMIN — VALACYCLOVIR 1000 MILLIGRAM(S): 500 TABLET, FILM COATED ORAL at 06:16

## 2018-08-16 NOTE — PROGRESS NOTE ADULT - SUBJECTIVE AND OBJECTIVE BOX
Pt seen and examined independently. No new complaints. MRI negative. Exam consistent with Bell's palsy. Cont steroids, antivirals, eye drops, eye patch.    Discharge instructions discussed and patient knows when to seek immediate medical attention.  Patient has proper follow up.  All results discussed and patient aware they may require further work up.  Stressed importance of proper follow up.  Medications prescribed discussed.  All questions and concerns from patient addressed. Understanding of instructions verbalized.    Time spent in completing discharge process and coordinating care 45 minutes.

## 2018-08-16 NOTE — DISCHARGE NOTE ADULT - SECONDARY DIAGNOSIS.
Type 2 diabetes mellitus without complication, with long-term current use of insulin Hypertension, unspecified type

## 2018-08-16 NOTE — DISCHARGE NOTE ADULT - CARE PLAN
Principal Discharge DX:	Bell's palsy  Goal:	Treat facial droop  Assessment and plan of treatment:	Take all medications as prescribed. Take full course of steroids (make sure to decrease by 1 tablet each day until finished) and antiviral medication. Follow up with primary care doctor in one week. Principal Discharge DX:	Bell's palsy  Goal:	Treat facial droop  Assessment and plan of treatment:	Take all medications as prescribed. Take full course of steroids (make sure to decrease by 1 tablet each day until finished) and antiviral medication. Follow up with primary care doctor in one week, neurologist in 1-2wks. Use Artificial eye drops to Rt eye and eye patch during sleep to prevent corneal abrasion.  Secondary Diagnosis:	Type 2 diabetes mellitus without complication, with long-term current use of insulin  Assessment and plan of treatment:	adjust your Insulin pump as your sugars will be higher while on Prednisone taper  Secondary Diagnosis:	Hypertension, unspecified type  Assessment and plan of treatment:	Due to nationwide recall of Valsartan, we switched Valsartan to Losartan. D/w your PMD and monitor your BP.

## 2018-08-16 NOTE — DISCHARGE NOTE ADULT - MEDICATION SUMMARY - MEDICATIONS TO TAKE
I will START or STAY ON the medications listed below when I get home from the hospital:    predniSONE 10 mg oral tablet  -- 7 tabs by mouth day 1, then 6 tabs day 2, then 5 tabs day 3. Continue decreasing until 1 tab on day 7, then stop.  -- It is very important that you take or use this exactly as directed.  Do not skip doses or discontinue unless directed by your doctor.  Obtain medical advice before taking any non-prescription drugs as some may affect the action of this medication.  Take with food or milk.    -- Indication: For Facial droop    valsartan 40 mg oral tablet  -- 1 tab(s) by mouth 2 times a day  -- Indication: For High blood pressure    simvastatin 20 mg oral tablet  -- 1 tab(s) by mouth once a day (at bedtime)  -- Indication: For High cholesterol    valACYclovir 1 g oral tablet  -- 1 tab(s) by mouth 3 times a day  -- Indication: For Facial droop I will START or STAY ON the medications listed below when I get home from the hospital:    predniSONE 10 mg oral tablet  -- 6 tabs by mouth day 1, then 5  tabs day 2, then 4 tabs day 3, 3 tabs on day 4, 2 tabs on day 5, 1 tab on day 6, then stop. MDD:6  -- It is very important that you take or use this exactly as directed.  Do not skip doses or discontinue unless directed by your doctor.  Obtain medical advice before taking any non-prescription drugs as some may affect the action of this medication.  Take with food or milk.    -- Indication: For facial droop    aspirin 81 mg oral delayed release tablet  -- 1 tab(s) by mouth once a day  -- Indication: For prophylaxis    losartan 100 mg oral tablet  -- 1 tab(s) by mouth once a day  -- Indication: For HTN (instead of Valsartan due to nationwide recall of Valsartan)    simvastatin 20 mg oral tablet  -- 1 tab(s) by mouth once a day (at bedtime)  -- Indication: For High cholesterol    valACYclovir 1 g oral tablet  -- 1 tab(s) by mouth 3 times a day for 6 more days MDD:3  -- Indication: For antiviral    Refresh ophthalmic solution  -- 1 drop(s) to each affected eye 4 times a day  -- Indication: For eye lubricant

## 2018-08-16 NOTE — DISCHARGE NOTE ADULT - HOSPITAL COURSE
66 yo F with PMHx of HTN, HLD, DM II (has insulin pump) presented with complaint of sudden onset right sided facial droop occurring at 8AM on morning of presentation, as well as 4 day history of right sided headache. Patient states she awoke to wash her face when upon looking in the mirror noticed right facial drooping. Patient also endorses right sided headaches, pressure-like in nature not associated with photo/phonophobia. Pain mildly alleviated with Advil. Patient denies any weakness, paraesthesias chest pain, palpitations changes in vision, fever, chills, nausea, vomiting. CT head, MRI, and carotid duplex all negative. No bulging or drainage from right tympanic membrane. Was started on 7-day prednisone taper and 7-day course of valacyclovir. Follow up with PCP outpatient.

## 2018-08-16 NOTE — DISCHARGE NOTE ADULT - PATIENT PORTAL LINK FT
You can access the [facility] Patient Portal, offered by [facility], by registering with the following website: [email or web address]

## 2018-08-16 NOTE — DISCHARGE NOTE ADULT - PLAN OF CARE
Treat facial droop Take all medications as prescribed. Take full course of steroids (make sure to decrease by 1 tablet each day until finished) and antiviral medication. Follow up with primary care doctor in one week. Take all medications as prescribed. Take full course of steroids (make sure to decrease by 1 tablet each day until finished) and antiviral medication. Follow up with primary care doctor in one week, neurologist in 1-2wks. Use Artificial eye drops to Rt eye and eye patch during sleep to prevent corneal abrasion. adjust your Insulin pump as your sugars will be higher while on Prednisone taper Due to nationwide recall of Valsartan, we switched Valsartan to Losartan. D/w your PMD and monitor your BP.

## 2018-08-16 NOTE — DISCHARGE NOTE ADULT - PROVIDER TOKENS
FREE:[LAST:[Marily],FIRST:[Lily],PHONE:[phone]],FAX:[(   )    -]] FREE:[LAST:[Marily],FIRST:[Lily],PHONE:[phone]],FAX:[(   )    -]],TOKEN:'[id number]'

## 2018-08-16 NOTE — DISCHARGE NOTE ADULT - MEDICATION SUMMARY - MEDICATIONS TO STOP TAKING
I will STOP taking the medications listed below when I get home from the hospital:    doxycycline monohydrate 100 mg oral capsule  -- 1 cap(s) by mouth 2 times a day   -- Avoid prolonged or excessive exposure to direct and/or artificial sunlight while taking this medication.  Do not take this drug if you are pregnant.  Finish all this medication unless otherwise directed by prescriber.  Medication should be taken with plenty of water. I will STOP taking the medications listed below when I get home from the hospital:    valsartan 40 mg oral tablet  -- 1 tab(s) by mouth 2 times a day    doxycycline monohydrate 100 mg oral capsule  -- 1 cap(s) by mouth 2 times a day   -- Avoid prolonged or excessive exposure to direct and/or artificial sunlight while taking this medication.  Do not take this drug if you are pregnant.  Finish all this medication unless otherwise directed by prescriber.  Medication should be taken with plenty of water.

## 2018-08-16 NOTE — DISCHARGE NOTE ADULT - CARE PROVIDER_API CALL
Lily Calixto  Phone: [phone]  Fax: (       - Lily Calixto  Phone: [phone]  Fax: (   )    -    William Sandoval), Neurology  [facility] [place] [address]  Phone: [phone]  Fax: [phone]

## 2018-08-23 DIAGNOSIS — G51.0 BELL'S PALSY: ICD-10-CM

## 2018-08-23 DIAGNOSIS — E11.9 TYPE 2 DIABETES MELLITUS WITHOUT COMPLICATIONS: ICD-10-CM

## 2018-08-23 DIAGNOSIS — E78.5 HYPERLIPIDEMIA, UNSPECIFIED: ICD-10-CM

## 2018-08-23 DIAGNOSIS — Z96.41 PRESENCE OF INSULIN PUMP (EXTERNAL) (INTERNAL): ICD-10-CM

## 2018-08-23 DIAGNOSIS — I10 ESSENTIAL (PRIMARY) HYPERTENSION: ICD-10-CM

## 2018-08-23 DIAGNOSIS — Z79.4 LONG TERM (CURRENT) USE OF INSULIN: ICD-10-CM

## 2018-08-25 ENCOUNTER — OUTPATIENT (OUTPATIENT)
Dept: OUTPATIENT SERVICES | Facility: HOSPITAL | Age: 68
LOS: 1 days | Discharge: HOME | End: 2018-08-25

## 2018-08-25 DIAGNOSIS — E55.9 VITAMIN D DEFICIENCY, UNSPECIFIED: ICD-10-CM

## 2018-08-25 DIAGNOSIS — E78.5 HYPERLIPIDEMIA, UNSPECIFIED: ICD-10-CM

## 2018-08-25 DIAGNOSIS — E03.9 HYPOTHYROIDISM, UNSPECIFIED: ICD-10-CM

## 2018-08-25 DIAGNOSIS — E10.65 TYPE 1 DIABETES MELLITUS WITH HYPERGLYCEMIA: ICD-10-CM

## 2018-08-25 DIAGNOSIS — E53.8 DEFICIENCY OF OTHER SPECIFIED B GROUP VITAMINS: ICD-10-CM

## 2019-01-21 NOTE — PATIENT PROFILE ADULT. - DOES PATIENT HAVE ADVANCE DIRECTIVE
Patient is a 71y old  Female who presented with a chief complaint of Small Bowel Obstruction (21 Jan 2019 10:17)      INTERVAL HPI/OVERNIGHT EVENTS:  7 BMs yesterday - some crampy discomfort with BMs  no nausea or vomiting, tolerating PO diet    no fever or chills  underwent lung mass biopsy on Friday    MEDICATIONS  (STANDING):  ALBUTerol/ipratropium for Nebulization 3 milliLiter(s) Nebulizer every 6 hours  buDESOnide   0.5 milliGRAM(s) Respule 0.5 milliGRAM(s) Inhalation every 12 hours  chlorhexidine 4% Liquid 1 Application(s) Topical <User Schedule>  docusate sodium 100 milliGRAM(s) Oral three times a day  enoxaparin Injectable 30 milliGRAM(s) SubCutaneous daily  influenza   Vaccine 0.5 milliLiter(s) IntraMuscular once  lidocaine   Patch 1 Patch Transdermal daily  naloxegol 25 milliGRAM(s) Oral daily  nicotine -  14 mG/24Hr(s) Patch 1 patch Transdermal daily  pantoprazole    Tablet 40 milliGRAM(s) Oral before breakfast  senna 1 Tablet(s) Oral daily    MEDICATIONS  (PRN):  diazepam    Tablet 2.5 milliGRAM(s) Oral two times a day PRN Anxiety  oxyCODONE    IR 10 milliGRAM(s) Oral every 6 hours PRN Moderate Pain (4 - 6)      Allergies  Biaxin (Rash)  Cipro (Headache)  Flagyl (Headache)  penicillin (Rash; Swelling)  sulfa drugs (Unknown)      Review of Systems:  General:  No fevers, chills, night sweats  CV:  No pain, palpitations, hypo/hypertension  Resp:  No dyspnea, tachypnea, wheezing +COPD +lung mass  :  No pain, bleeding, incontinence, +arevalo  Muscle: chronic back pain  Neuro:  No weakness, tingling, memory problems  Psych:  No fatigue, insomnia, mood problems, depression  Endocrine:  No polyuria, polydypsia, cold/heat intolerance  Heme:  No petechiae, ecchymosis, easy bruisability  Skin:  No rash, tattoos, scars, edema    Vital Signs Last 24 Hrs  T(C): 36.9 (21 Jan 2019 09:32), Max: 37.2 (20 Jan 2019 18:00)  T(F): 98.5 (21 Jan 2019 09:32), Max: 98.9 (20 Jan 2019 18:00)  HR: 91 (21 Jan 2019 09:32) (71 - 100)  BP: 138/78 (21 Jan 2019 09:32) (129/68 - 147/80)  BP(mean): --  RR: 18 (21 Jan 2019 09:32) (18 - 20)  SpO2: 95% (21 Jan 2019 09:32) (95% - 97%)    PHYSICAL EXAM:  Constitutional: thin chronically ill appearing female   Neck: No LAD, supple no JVD  Respiratory: decreased BS b/l, distant BS  Cardiovascular: S1 and S2, regular  Gastrointestinal: softly distended NT. no rebound or rigidity +multiple surgical scars   Extremities: +clubbing no edema  Vascular: palpable  Neurological: A/O x 3, no focal deficits  Psychiatric: anxious  Skin: No rashes good turgor    LABS:                        11.5   8.04  )-----------( 210      ( 20 Jan 2019 11:32 )             36.6     01-21    137  |  102  |  26<H>  ----------------------------<  111<H>  4.8   |  23  |  1.46<H>    Ca    8.2<L>      21 Jan 2019 10:08  Phos  2.7     01-21  Mg     1.5     01-21        RADIOLOGY & ADDITIONAL TESTS: No